# Patient Record
Sex: FEMALE | Race: WHITE | NOT HISPANIC OR LATINO | Employment: OTHER | URBAN - METROPOLITAN AREA
[De-identification: names, ages, dates, MRNs, and addresses within clinical notes are randomized per-mention and may not be internally consistent; named-entity substitution may affect disease eponyms.]

---

## 2019-04-11 ENCOUNTER — TELEPHONE (OUTPATIENT)
Dept: OTOLARYNGOLOGY | Facility: CLINIC | Age: 49
End: 2019-04-11

## 2019-04-11 NOTE — TELEPHONE ENCOUNTER
Patient's father Dr. Herndon called and would like for you to call him on his cell phone [phone]

## 2019-04-15 ENCOUNTER — OFFICE VISIT (OUTPATIENT)
Dept: OTOLARYNGOLOGY | Facility: CLINIC | Age: 49
End: 2019-04-15
Payer: COMMERCIAL

## 2019-04-15 VITALS
DIASTOLIC BLOOD PRESSURE: 94 MMHG | WEIGHT: 147.25 LBS | BODY MASS INDEX: 22.32 KG/M2 | HEIGHT: 68 IN | SYSTOLIC BLOOD PRESSURE: 146 MMHG

## 2019-04-15 DIAGNOSIS — K13.21 LEUKOPLAKIA OF ORAL CAVITY: Primary | ICD-10-CM

## 2019-04-15 PROCEDURE — 3008F BODY MASS INDEX DOCD: CPT | Mod: CPTII,S$GLB,, | Performed by: OTOLARYNGOLOGY

## 2019-04-15 PROCEDURE — 88312 TISSUE SPECIMEN TO PATHOLOGY, ENT: ICD-10-PCS | Mod: 26,,, | Performed by: PATHOLOGY

## 2019-04-15 PROCEDURE — 99999 PR PBB SHADOW E&M-EST. PATIENT-LVL III: ICD-10-PCS | Mod: PBBFAC,,, | Performed by: OTOLARYNGOLOGY

## 2019-04-15 PROCEDURE — 40808 PR BIOPSY OF MOUTH LESION: ICD-10-PCS | Mod: S$GLB,,, | Performed by: OTOLARYNGOLOGY

## 2019-04-15 PROCEDURE — 88312 SPECIAL STAINS GROUP 1: CPT | Performed by: PATHOLOGY

## 2019-04-15 PROCEDURE — 3008F PR BODY MASS INDEX (BMI) DOCUMENTED: ICD-10-PCS | Mod: CPTII,S$GLB,, | Performed by: OTOLARYNGOLOGY

## 2019-04-15 PROCEDURE — 99204 PR OFFICE/OUTPT VISIT, NEW, LEVL IV, 45-59 MIN: ICD-10-PCS | Mod: 25,S$GLB,, | Performed by: OTOLARYNGOLOGY

## 2019-04-15 PROCEDURE — 88305 TISSUE EXAM BY PATHOLOGIST: CPT | Performed by: PATHOLOGY

## 2019-04-15 PROCEDURE — 88305 TISSUE SPECIMEN TO PATHOLOGY, ENT: ICD-10-PCS | Mod: 26,,, | Performed by: PATHOLOGY

## 2019-04-15 PROCEDURE — 88305 TISSUE EXAM BY PATHOLOGIST: CPT | Mod: 26,,, | Performed by: PATHOLOGY

## 2019-04-15 PROCEDURE — 88312 SPECIAL STAINS GROUP 1: CPT | Mod: 26,,, | Performed by: PATHOLOGY

## 2019-04-15 PROCEDURE — 40808 BIOPSY OF MOUTH LESION: CPT | Mod: S$GLB,,, | Performed by: OTOLARYNGOLOGY

## 2019-04-15 PROCEDURE — 99204 OFFICE O/P NEW MOD 45 MIN: CPT | Mod: 25,S$GLB,, | Performed by: OTOLARYNGOLOGY

## 2019-04-15 PROCEDURE — 99999 PR PBB SHADOW E&M-EST. PATIENT-LVL III: CPT | Mod: PBBFAC,,, | Performed by: OTOLARYNGOLOGY

## 2019-04-15 RX ORDER — NORETHINDRONE ACETATE AND ETHINYL ESTRADIOL AND FERROUS FUMARATE 5-7-9-7
1 KIT ORAL DAILY
COMMUNITY

## 2019-04-15 RX ORDER — ALBUTEROL SULFATE 0.83 MG/ML
2.5 SOLUTION RESPIRATORY (INHALATION) EVERY 6 HOURS PRN
COMMUNITY

## 2019-04-15 RX ORDER — HYOSCYAMINE SULFATE 0.12 MG/ML
0.12 LIQUID ORAL EVERY 4 HOURS
COMMUNITY
End: 2019-04-23 | Stop reason: ALTCHOICE

## 2019-04-15 NOTE — PROGRESS NOTES
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  This was incidentally found on routine dental cleaning.  Patient is completely asymptomatic.    She is a nonsmoker    ROS:  Constitutional: Negative for activity change and appetite change, weight loss.   Eyes: Negative for discharge, visual changes.   Respiratory: Negative for difficulty breathing and wheezing   Cardiovascular: Negative for chest pain.   Gastrointestinal: Negative for abdominal distention and abdominal pain.   Endocrine: Negative for cold intolerance and heat intolerance.   Genitourinary: Negative for dysuria.   Musculoskeletal: Negative for gait problem, muscle pain and joint swelling.   Skin: Negative for color change and pallor; negative for skin lesions.   Neurological: Negative for syncope and weakness; no numbness face.   Psychiatric/Behavioral: Negative for agitation and confusion; negative for depression.    Physical Exam:      Constitutional  · General Appearance: well nourished, well-developed, alert, oriented, in no acute distress  · Communication: ability, understanding, normal  Head and Face  · Inspection: normocephalic, atraumatic, no scars, lesions or masses   · Palpation: no stepoffs, sinus tenderness or masses  · Parotid glands: no masses, stones, swelling or tenderness  Eyes  · Ocular Motility / Alignment: normal alignment, motility, no proptosis, enophthalmus or nystagmus  · Conjunctiva: not injected  · Eyelids: no hooding, lag, entropion, or ectropion  Ears  · Hearing: speech reception thresholds grossly normal  · External Ears: no auricle lesions, non-tender, mobile to palpation  · Otoscopy:  · Right Ear: no tympanic membrane lesions, perforations, or  effusion, normal EAC  · Left Ear: no tympanic membrane lesions, perforations, or effusion, normal EAC  Nose  · External Nose: no lesions, tenderness, trauma or deformity  · Intranasal Exam: no edema, erythema, discharge, mass or obstruction  Oral Cavity / Oropharynx  · Lips: upper and lower lips pink and moist  · Teeth: good dentition  · Gingiva: area of leukoplakia lingual surface mandible at tooth # 31 about 1.5 X 0.7 cm minimally extending onto FOM; superficial to palpation  · Oral Mucosa: moist, no mucosal lesions  · Floor of Mouth: normal, no lesions, salivary ducts patent  · Tongue: moist, normal mobility, no lesions  · Palate: soft and hard palates without lesions or ulcers  Oropharynx: tonsils and walls without erythema, exudate, base of tongue soft to palpation  Nasopharynx, Hypopharynx, and Larynx  · Indirect: BOT and larynx normal  Neck  · Inspection and Palpation: no erythema, induration, emphysema, tenderness or masses  · Larynx and Trachea: normal position; normal crepitus  · Thyroid: no tenderness, enlargement or nodules  · Submandibular Glands: no masses or tenderness  Lymphatic:  · Anterior, Posterior, Submandibular, Submental, Supraclavicular: no lymphadenopathy present  Chest / Respiratory  · Chest: no stridor or retractions, normal effort and expansion  Cardiovascular:  · Pulses: 2+ carotid pulses bilaterally  · Auscultation: deferred  Neurological  · Cranial Nerves: grossly intact  · General: no focal deficits  Psychiatric  · Orientation: oriented to time, place and person  · Mood and Affect: no depression, anxiety or agitation  Extremities  · Inspection: moves all extremities well  Donor site  Chest, Back, Abdomen, Arms, Legs: N/A     Assessment:   Leukoplakia lingual surface of mandible at tooth # 31  23/19 for results and re-evaluation    Plan:  Biopsy today  Follow-up April 23, 2019 for biopsy results and re-evaluation    Procedure:  Risks, benefits, and alternatives were discussed with the  patient, questions were answered and consent was signed  Photos were taken for the chart  The area to be biopsied was injected with 1% lidocaine with 1 100,000 epinephrine for hemostasis and anesthesia  3 mm punch biopsy in scalpel blade were used to biopsy the lesion  Silver nitrate was used for chemical cautery  Patient rinsed the oral cavity until clear  Re-evaluation showed no bleeding  Wound Care in diet instructions were given

## 2019-04-15 NOTE — LETTER
April 15, 2019      Ortiz Herndon MD  [address] [place] [address]           [facility]  [address] [facility] [address] [place] [address]  Phone: [phone]  Fax: [phone]          Patient: Maddie Herndon   MR Number: [id number]   YOB: 1970   Date of Visit: 4/15/2019       Dear Dr. Ortiz Herndon:    Thank you for referring Maddie Herndon to me for evaluation. Attached you will find relevant portions of my assessment and plan of care.    If you have questions, please do not hesitate to call me. I look forward to following Maddie Herndon along with you.    Sincerely,    Elisa Jeffrey MD    Enclosure  CC:  No Recipients    If you would like to receive this communication electronically, please contact [email or web address] or [phone] to request more information on [facility] access.    For providers and/or their staff who would like to refer a patient to [facility], please contact us through our one-stop-shop provider referral line, [facility], at [phone].    If you feel you have received this communication in error or would no longer like to receive these types of communications, please e-mail [email or web address]

## 2019-04-18 ENCOUNTER — TELEPHONE (OUTPATIENT)
Dept: OTOLARYNGOLOGY | Facility: CLINIC | Age: 49
End: 2019-04-18

## 2019-04-18 ENCOUNTER — DOCUMENTATION ONLY (OUTPATIENT)
Dept: OTOLARYNGOLOGY | Facility: CLINIC | Age: 49
End: 2019-04-18

## 2019-04-18 DIAGNOSIS — G89.18 POST-OP PAIN: Primary | ICD-10-CM

## 2019-04-18 DIAGNOSIS — K14.8 TONGUE LESION: ICD-10-CM

## 2019-04-18 DIAGNOSIS — G89.18 POST-OP PAIN: ICD-10-CM

## 2019-04-18 DIAGNOSIS — K13.79 MOUTH PAIN: Primary | ICD-10-CM

## 2019-04-18 DIAGNOSIS — K13.70 MOUTH LESION: ICD-10-CM

## 2019-04-18 DIAGNOSIS — K13.79 MOUTH PAIN: ICD-10-CM

## 2019-04-18 RX ORDER — OXYCODONE AND ACETAMINOPHEN 5; 325 MG/1; MG/1
1 TABLET ORAL EVERY 6 HOURS PRN
Qty: 40 TABLET | Refills: 0 | Status: CANCELLED | OUTPATIENT
Start: 2019-04-18 | End: 2019-05-02

## 2019-04-18 RX ORDER — OXYCODONE AND ACETAMINOPHEN 5; 325 MG/1; MG/1
1 TABLET ORAL EVERY 4 HOURS PRN
Qty: 40 TABLET | Refills: 0 | Status: SHIPPED | OUTPATIENT
Start: 2019-04-18

## 2019-04-18 RX ORDER — OXYCODONE AND ACETAMINOPHEN 5; 325 MG/1; MG/1
1 TABLET ORAL EVERY 6 HOURS PRN
Qty: 40 TABLET | Refills: 0 | Status: SHIPPED | OUTPATIENT
Start: 2019-04-18 | End: 2019-04-18 | Stop reason: CLARIF

## 2019-04-18 NOTE — TELEPHONE ENCOUNTER
----- Message from Denzel Infante sent at 4/18/2019  1:23 PM CDT -----  Contact: Patient  Type: Needs Medical Advice    Who Called:  Patient  [id number] - [place], [place] - [address]  [address]  [address]  [facility] [address]  Phone: [phone] Fax: [phone]  Best Call Back Number: [phone]  Additional Information: Caller states that oxyCODONE-acetaminophen (PERCOCET) 5-325 mg per tablet and a numbing mouth wash was sent to the wrong pharmacy. Please send to the above. Thanks!

## 2019-04-18 NOTE — TELEPHONE ENCOUNTER
Called patient advised that Dr. Jeffrey was in surgery and that I would speak to Dr. Jeffrey to see if she wanted her to come in the office or call in prescription. Please advise.

## 2019-04-18 NOTE — TELEPHONE ENCOUNTER
----- Message from Angeles Muro sent at 4/18/2019  9:39 AM CDT -----  Contact: Patient  Type: Needs Medical Advice    Who Called:  Maddie  Kunal Call Back Number:   Additional Information: Patient is calling to inform the office that her mouth is in pain.Please call back and advise.

## 2019-04-18 NOTE — PROGRESS NOTES
I spoke with patient regarding pain  6/10 which makes it difficult to open her mouth wide  She also C/O tylenol being stuck in her throat since last night  She denies drooling and can swallow liquids.  No burning in chest/esophagus    1. Percocet 5 mg q 4 hours prn pain  2. Magic mouthwash qid  3. Advil gel caps for breakthrough pain  4. Drink lots of water and eat soft foods to push pills down in event that are lodged although doubtful due to symptoms  She will call me at 8 pm tonight to let me know if she is better

## 2019-04-18 NOTE — TELEPHONE ENCOUNTER
[facility] pharmacy has questions about prescription they need to know are the different parts equal please call [phone] to advise

## 2019-04-18 NOTE — TELEPHONE ENCOUNTER
Pt called and states the rxs were sent to the wrong pharmacy in [place]. Pt is requesting the rx be sent to [facility], [address] [place] [address]. [facility] in [place] has deleted the original rxs so that they can be resent to [place].

## 2019-04-23 ENCOUNTER — OFFICE VISIT (OUTPATIENT)
Dept: OTOLARYNGOLOGY | Facility: CLINIC | Age: 49
End: 2019-04-23
Payer: COMMERCIAL

## 2019-04-23 VITALS
BODY MASS INDEX: 21.88 KG/M2 | SYSTOLIC BLOOD PRESSURE: 132 MMHG | HEIGHT: 68 IN | HEART RATE: 68 BPM | DIASTOLIC BLOOD PRESSURE: 91 MMHG | WEIGHT: 144.38 LBS

## 2019-04-23 DIAGNOSIS — K13.21 LEUKOPLAKIA OF ORAL CAVITY: Primary | ICD-10-CM

## 2019-04-23 DIAGNOSIS — R25.2 TRISMUS: ICD-10-CM

## 2019-04-23 DIAGNOSIS — M26.609 TMJ DYSFUNCTION: ICD-10-CM

## 2019-04-23 PROCEDURE — 99024 POSTOP FOLLOW-UP VISIT: CPT | Mod: S$GLB,,, | Performed by: OTOLARYNGOLOGY

## 2019-04-23 PROCEDURE — 99999 PR PBB SHADOW E&M-EST. PATIENT-LVL III: ICD-10-PCS | Mod: PBBFAC,,, | Performed by: OTOLARYNGOLOGY

## 2019-04-23 PROCEDURE — 3008F BODY MASS INDEX DOCD: CPT | Mod: CPTII,S$GLB,, | Performed by: OTOLARYNGOLOGY

## 2019-04-23 PROCEDURE — 3008F PR BODY MASS INDEX (BMI) DOCUMENTED: ICD-10-PCS | Mod: CPTII,S$GLB,, | Performed by: OTOLARYNGOLOGY

## 2019-04-23 PROCEDURE — 99024 PR POST-OP FOLLOW-UP VISIT: ICD-10-PCS | Mod: S$GLB,,, | Performed by: OTOLARYNGOLOGY

## 2019-04-23 PROCEDURE — 99999 PR PBB SHADOW E&M-EST. PATIENT-LVL III: CPT | Mod: PBBFAC,,, | Performed by: OTOLARYNGOLOGY

## 2019-04-23 RX ORDER — TRIAMCINOLONE ACETONIDE 1 MG/G
PASTE DENTAL
Refills: 0 | COMMUNITY
Start: 2019-04-15

## 2019-04-23 RX ORDER — TIZANIDINE 4 MG/1
TABLET ORAL
Refills: 1 | COMMUNITY
Start: 2019-04-19

## 2019-04-23 NOTE — PROGRESS NOTES
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  This was incidentally found on routine dental cleaning.  Patient is completely asymptomatic.    She is a nonsmoker    4/23/2019  Patient is here today for follow-up and path results.  Since the biopsy she has experienced pain and trismus.  She ultimately responded to a muscle relaxant.  She reports today that she does have a history of TMJ dysfunction and grinding of her teeth.  She wears a splint nightly but has not been able to do so since surgery which is likely aggravated the situation.  This was not previously disclosed to me.  However trismus is much improved with massage, Advil and muscle relaxant which she takes at night    ROS:  Constitutional: Negative for activity change and appetite change, weight loss.   Eyes: Negative for discharge, visual changes.   Respiratory: Negative for difficulty breathing and wheezing   Cardiovascular: Negative for chest pain.   Gastrointestinal: Negative for abdominal distention and abdominal pain.   Endocrine: Negative for cold intolerance and heat intolerance.   Genitourinary: Negative for dysuria.   Musculoskeletal: Negative for gait problem, muscle pain and joint swelling.   Skin: Negative for color change and pallor; negative for skin lesions.   Neurological: Negative for syncope and weakness; no numbness face.   Psychiatric/Behavioral: Negative for agitation and confusion; negative for depression.    Physical Exam:      Constitutional  · General Appearance: well nourished, well-developed, alert, oriented, in no acute distress  · Communication: ability, understanding, normal  Oral Cavity / Oropharynx--mild trismus  · Lips: upper and lower lips pink and  moist  · Teeth: good dentition  · Gingiva: area of healing biopsy site lingual surface mandible at tooth # 31 about 1.5 X 1.0 cm -no exposed bone seen or palpated  · Oral Mucosa: moist, no mucosal lesions  · Floor of Mouth: normal, no lesions, salivary ducts patent  · Tongue: moist, normal mobility, no lesions  · Palate: soft and hard palates without lesions or ulcers  Neck  · Inspection and Palpation: no erythema, induration, emphysema, tenderness or masses  · Larynx and Trachea: normal position; normal crepitus  · Thyroid: no tenderness, enlargement or nodules  · Submandibular Glands: no masses or tenderness  Lymphatic:  · Anterior, Posterior, Submandibular, Submental, Supraclavicular: no lymphadenopathy present    Path pending     Assessment:   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

## 2019-05-20 ENCOUNTER — TELEPHONE (OUTPATIENT)
Dept: OTOLARYNGOLOGY | Facility: CLINIC | Age: 49
End: 2019-05-20

## 2019-05-20 NOTE — TELEPHONE ENCOUNTER
----- Message from Yonathan Kyle sent at 5/20/2019  2:43 PM CDT -----  Contact: beth with [facility]  Type:  Pharmacy Calling to Clarify an RX    Name of Caller:  Beth  [facility]  Prescription Name:  oxyCODONE-acetaminophen (PERCOCET) 5-325 mg per tablet  What do they need to clarify?:  Clinical questions for appeal  Best Call Back Number:  [phone]  Reference number: [id number]  Additional Information:

## 2019-06-04 ENCOUNTER — OFFICE VISIT (OUTPATIENT)
Dept: OTOLARYNGOLOGY | Facility: CLINIC | Age: 49
End: 2019-06-04
Payer: COMMERCIAL

## 2019-06-04 VITALS
HEIGHT: 68 IN | BODY MASS INDEX: 21.88 KG/M2 | HEART RATE: 65 BPM | SYSTOLIC BLOOD PRESSURE: 132 MMHG | DIASTOLIC BLOOD PRESSURE: 86 MMHG | WEIGHT: 144.38 LBS

## 2019-06-04 DIAGNOSIS — S01.502S: ICD-10-CM

## 2019-06-04 DIAGNOSIS — K21.9 LARYNGOPHARYNGEAL REFLUX (LPR): ICD-10-CM

## 2019-06-04 DIAGNOSIS — K13.21 LEUKOPLAKIA OF ORAL CAVITY: Primary | ICD-10-CM

## 2019-06-04 DIAGNOSIS — M26.609 TMJ DYSFUNCTION: ICD-10-CM

## 2019-06-04 PROCEDURE — 3008F BODY MASS INDEX DOCD: CPT | Mod: CPTII,S$GLB,, | Performed by: OTOLARYNGOLOGY

## 2019-06-04 PROCEDURE — 99999 PR PBB SHADOW E&M-EST. PATIENT-LVL III: CPT | Mod: PBBFAC,,, | Performed by: OTOLARYNGOLOGY

## 2019-06-04 PROCEDURE — 99999 PR PBB SHADOW E&M-EST. PATIENT-LVL III: ICD-10-PCS | Mod: PBBFAC,,, | Performed by: OTOLARYNGOLOGY

## 2019-06-04 PROCEDURE — 99213 OFFICE O/P EST LOW 20 MIN: CPT | Mod: S$GLB,,, | Performed by: OTOLARYNGOLOGY

## 2019-06-04 PROCEDURE — 3008F PR BODY MASS INDEX (BMI) DOCUMENTED: ICD-10-PCS | Mod: CPTII,S$GLB,, | Performed by: OTOLARYNGOLOGY

## 2019-06-04 PROCEDURE — 99213 PR OFFICE/OUTPT VISIT, EST, LEVL III, 20-29 MIN: ICD-10-PCS | Mod: S$GLB,,, | Performed by: OTOLARYNGOLOGY

## 2019-06-04 RX ORDER — ESOMEPRAZOLE MAGNESIUM 40 MG/1
40 CAPSULE, DELAYED RELEASE ORAL
Qty: 30 CAPSULE | Refills: 6 | Status: SHIPPED | OUTPATIENT
Start: 2019-06-04 | End: 2019-12-31

## 2019-06-04 NOTE — PROGRESS NOTES
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      This was incidentally found on routine dental cleaning.  Patient is completely asymptomatic.    She is a nonsmoker    4/23/2019  Patient is here today for follow-up and path results.  Since the biopsy she has experienced pain and trismus.  She ultimately responded to a muscle relaxant.  She reports today that she does have a history of TMJ dysfunction and grinding of her teeth.  She wears a splint nightly but has not been able to do so since surgery which is likely aggravated the situation.  This was not previously disclosed to me.  However trismus is much improved with massage, Advil and muscle relaxant which she takes at night    6/4/2019  Here for F/U.  Hx of leukoplakia lingual surface left mandible S/P biopsy--benign.  Slow to heal wound  Presented today for routine checkup.  She reports that wound has healed and her splint has been modified.    She reports new onset LPR. C/O burning in throat followed with hoarseness. + globus sensation and nocturnal cough.        ROS: see HPI  Constitutional: Negative for activity change and appetite change, weight loss.   Eyes: Negative for discharge, visual changes.   Respiratory: Negative for difficulty breathing and wheezing   Cardiovascular: Negative for chest pain.   Gastrointestinal: Negative for abdominal distention and abdominal pain.   Endocrine: Negative for cold intolerance and heat intolerance.   Genitourinary: Negative for dysuria.   Musculoskeletal: Negative for gait problem, muscle pain and joint swelling.   Skin: Negative for color change and pallor; negative for skin lesions.   Neurological: Negative for syncope and weakness; no  numbness face.   Psychiatric/Behavioral: Negative for agitation and confusion; negative for depression.    Physical Exam:      Constitutional  · General Appearance: well nourished, well-developed, alert, oriented, in no acute distress  · Communication: ability, understanding, normal  Oral Cavity / Oropharynx--mild trismus  · Lips: upper and lower lips pink and moist  · Teeth: good dentition  · Gingiva: area of biopsy healed; no lesions  · Oral Mucosa: moist, no mucosal lesions  · Floor of Mouth: normal, no lesions, salivary ducts patent  · Tongue: moist, normal mobility, no lesions  · Palate: soft and hard palates without lesions or ulcers  Lymphatic:  · Anterior, Posterior, Submandibular, Submental, Supraclavicular: no lymphadenopathy present       Assessment:   Leukoplakia lingual surface of mandible--biopsy benign; wound healed  LPR    Plan:  Nexium 40 mg q day  Diet modificiation  F/U PRN

## 2019-06-05 PROBLEM — S01.502A: Status: ACTIVE | Noted: 2019-06-05

## 2021-01-22 ENCOUNTER — PATIENT MESSAGE (OUTPATIENT)
Dept: ADMINISTRATIVE | Facility: OTHER | Age: 51
End: 2021-01-22